# Patient Record
Sex: FEMALE | Race: WHITE | NOT HISPANIC OR LATINO | URBAN - METROPOLITAN AREA
[De-identification: names, ages, dates, MRNs, and addresses within clinical notes are randomized per-mention and may not be internally consistent; named-entity substitution may affect disease eponyms.]

---

## 2017-03-21 ENCOUNTER — OFFICE VISIT - RIVER FALLS (OUTPATIENT)
Dept: FAMILY MEDICINE | Facility: CLINIC | Age: 47
End: 2017-03-21

## 2018-01-24 ENCOUNTER — OFFICE VISIT - RIVER FALLS (OUTPATIENT)
Dept: FAMILY MEDICINE | Facility: CLINIC | Age: 48
End: 2018-01-24

## 2018-03-20 ENCOUNTER — OFFICE VISIT - RIVER FALLS (OUTPATIENT)
Dept: FAMILY MEDICINE | Facility: CLINIC | Age: 48
End: 2018-03-20

## 2018-03-20 ASSESSMENT — MIFFLIN-ST. JEOR: SCORE: 1341.66

## 2018-08-01 ENCOUNTER — AMBULATORY - RIVER FALLS (OUTPATIENT)
Dept: FAMILY MEDICINE | Facility: CLINIC | Age: 48
End: 2018-08-01

## 2018-08-01 ENCOUNTER — OFFICE VISIT - RIVER FALLS (OUTPATIENT)
Dept: FAMILY MEDICINE | Facility: CLINIC | Age: 48
End: 2018-08-01

## 2018-08-01 ASSESSMENT — MIFFLIN-ST. JEOR: SCORE: 1305.37

## 2019-10-29 ENCOUNTER — OFFICE VISIT - RIVER FALLS (OUTPATIENT)
Dept: FAMILY MEDICINE | Facility: CLINIC | Age: 49
End: 2019-10-29

## 2019-10-29 ASSESSMENT — MIFFLIN-ST. JEOR: SCORE: 1275.43

## 2019-10-30 ENCOUNTER — COMMUNICATION - RIVER FALLS (OUTPATIENT)
Dept: FAMILY MEDICINE | Facility: CLINIC | Age: 49
End: 2019-10-30

## 2019-10-30 LAB
BUN SERPL-MCNC: 20 MG/DL (ref 7–25)
BUN/CREAT RATIO - HISTORICAL: NORMAL (ref 6–22)
CALCIUM SERPL-MCNC: 9.7 MG/DL (ref 8.6–10.2)
CHLORIDE BLD-SCNC: 106 MMOL/L (ref 98–110)
CHOLEST SERPL-MCNC: 207 MG/DL
CHOLEST/HDLC SERPL: 3.4 {RATIO}
CO2 SERPL-SCNC: 29 MMOL/L (ref 20–32)
CREAT SERPL-MCNC: 0.83 MG/DL (ref 0.5–1.1)
EGFRCR SERPLBLD CKD-EPI 2021: 84 ML/MIN/1.73M2
ERYTHROCYTE [DISTWIDTH] IN BLOOD BY AUTOMATED COUNT: 11.8 % (ref 11–15)
GLUCOSE BLD-MCNC: 97 MG/DL (ref 65–99)
HCT VFR BLD AUTO: 39.6 % (ref 35–45)
HDLC SERPL-MCNC: 61 MG/DL
HGB BLD-MCNC: 13.5 GM/DL (ref 11.7–15.5)
LDLC SERPL CALC-MCNC: 122 MG/DL
MCH RBC QN AUTO: 30.3 PG (ref 27–33)
MCHC RBC AUTO-ENTMCNC: 34.1 GM/DL (ref 32–36)
MCV RBC AUTO: 88.8 FL (ref 80–100)
NONHDLC SERPL-MCNC: 146 MG/DL
PLATELET # BLD AUTO: 184 10*3/UL (ref 140–400)
PMV BLD: 11.9 FL (ref 7.5–12.5)
POTASSIUM BLD-SCNC: 4.5 MMOL/L (ref 3.5–5.3)
RBC # BLD AUTO: 4.46 10*6/UL (ref 3.8–5.1)
SODIUM SERPL-SCNC: 140 MMOL/L (ref 135–146)
TRIGL SERPL-MCNC: 127 MG/DL
TSH SERPL DL<=0.005 MIU/L-ACNC: 2.05 MIU/L
WBC # BLD AUTO: 4.5 10*3/UL (ref 3.8–10.8)

## 2019-10-31 ENCOUNTER — TRANSFERRED RECORDS (OUTPATIENT)
Dept: HEALTH INFORMATION MANAGEMENT | Facility: CLINIC | Age: 49
End: 2019-10-31

## 2019-10-31 LAB — HPV ABSTRACT: NORMAL

## 2019-11-05 ENCOUNTER — COMMUNICATION - RIVER FALLS (OUTPATIENT)
Dept: FAMILY MEDICINE | Facility: CLINIC | Age: 49
End: 2019-11-05

## 2020-04-28 ENCOUNTER — OFFICE VISIT - RIVER FALLS (OUTPATIENT)
Dept: FAMILY MEDICINE | Facility: CLINIC | Age: 50
End: 2020-04-28

## 2020-05-20 ENCOUNTER — OFFICE VISIT - RIVER FALLS (OUTPATIENT)
Dept: FAMILY MEDICINE | Facility: CLINIC | Age: 50
End: 2020-05-20

## 2020-05-26 ENCOUNTER — OFFICE VISIT - RIVER FALLS (OUTPATIENT)
Dept: FAMILY MEDICINE | Facility: CLINIC | Age: 50
End: 2020-05-26

## 2022-02-11 VITALS
TEMPERATURE: 98 F | SYSTOLIC BLOOD PRESSURE: 108 MMHG | HEIGHT: 63 IN | HEART RATE: 68 BPM | DIASTOLIC BLOOD PRESSURE: 60 MMHG | BODY MASS INDEX: 27.78 KG/M2 | WEIGHT: 156.8 LBS

## 2022-02-11 VITALS
BODY MASS INDEX: 29.2 KG/M2 | OXYGEN SATURATION: 97 % | TEMPERATURE: 98.5 F | HEIGHT: 63 IN | DIASTOLIC BLOOD PRESSURE: 62 MMHG | SYSTOLIC BLOOD PRESSURE: 104 MMHG | WEIGHT: 164.8 LBS | HEART RATE: 98 BPM

## 2022-02-11 VITALS
BODY MASS INDEX: 26.61 KG/M2 | TEMPERATURE: 98.1 F | HEART RATE: 91 BPM | DIASTOLIC BLOOD PRESSURE: 70 MMHG | WEIGHT: 150.2 LBS | SYSTOLIC BLOOD PRESSURE: 126 MMHG | HEIGHT: 63 IN | OXYGEN SATURATION: 97 %

## 2022-02-11 VITALS
TEMPERATURE: 99 F | OXYGEN SATURATION: 96 % | WEIGHT: 159.8 LBS | SYSTOLIC BLOOD PRESSURE: 100 MMHG | HEART RATE: 109 BPM | BODY MASS INDEX: 28.31 KG/M2 | DIASTOLIC BLOOD PRESSURE: 64 MMHG

## 2022-02-11 VITALS
TEMPERATURE: 101.6 F | DIASTOLIC BLOOD PRESSURE: 58 MMHG | BODY MASS INDEX: 28.34 KG/M2 | HEART RATE: 110 BPM | WEIGHT: 160 LBS | SYSTOLIC BLOOD PRESSURE: 100 MMHG | OXYGEN SATURATION: 96 %

## 2022-02-11 VITALS — HEIGHT: 63 IN | BODY MASS INDEX: 27.17 KG/M2 | BODY MASS INDEX: 27.17 KG/M2 | HEIGHT: 63 IN

## 2022-02-11 VITALS — HEIGHT: 63 IN

## 2022-02-16 NOTE — PROGRESS NOTES
Chief Complaint    Patient presents for annual physical. Pressure bilateral sternum moves inner left and right upper arms, aslo, inner left thigh sharp pain off/on x 6 month. Fith digit on left foot gray spot and right great toe with gray spots no known injury x 6 weeks.  History of Present Illness          Patient is here for her annual physical.  It has been awhile since she had a physical and is due for a pap smear.   She does have some concerns about having some sternum pressure that radiates to her right upper arm for the past 6 months.  Does feel fatigued as well with her symptoms.  She also has pain to her left thigh for same length of time.  She also has concerns about some bruising/discoloration to her right great toenail.  She denies any injury to her toe and has noticed it for about 3-4 weeks.  Her menstrual cycle has always been infrequent and the flow changes from being slight to heavy.  She does have an IUD that needs to be removed and would like to discuss going back on BCP.            Weight one year ago 156.8lbs.  Today s weight 150.2lbs.          Last Pap:  12/27/2013          Hx of abnormal paps:  No            Most recent mammogram:  Due          Colon cancer screening status:  N/A          Dexa scan:  N/A          Last Dental Exam:  UTD          Last Eye Exam: UTD          Immunizations:  Flu shot due.              Review of Systems      Constitutional: Negative.      Eye: Negative.      Ear/Nose/Mouth/Throat: Negative.      Respiratory: Negative.      Cardiovascular: Negative.      Gastrointestinal: Negative.      Genitourinary: Negative.      Hematology/Lymphatics: Negative.      Endocrine: Negative.      Immunologic: Negative.      Musculoskeletal: Negative.      Integumentary: Negative.      Neurologic: Negative.      Psychiatric: Negative.      All other systems reviewed and negative         Physical Exam   Vitals & Measurements    T: 98.1   F (Tympanic)  HR: 91(Peripheral)  BP: 126/70   SpO2: 97%     HT: 63 in  WT: 150.2 lb  BMI: 26.6       General: Alert and oriented, Fatigue, No acute distress.      Eye: Pupils are equal, round and reactive to light, Extraocular movements are intact, Normal conjunctiva.      HENT: Normocephalic, Tympanic membranes are clear, Oral mucosa is moist, No pharyngeal erythema, No sinus tenderness.      Neck: Supple, Non-tender, No carotid bruit, No lymphadenopathy, No thyromegaly.      Respiratory: Lungs are clear to auscultation, Respirations are non-labored, Breath sounds are equal, No chest wall tenderness.      Cardiovascular: Normal rate, Regular rhythm, No murmur, No gallop, Normal peripheral perfusion, No edema.      Breast: No mass, No tenderness, No discharge.      Gastrointestinal: Soft, Non-tender, Normal bowel sounds, No organomegaly.      Genitourinary: No costovertebral angle tenderness.      Labia: Within normal limits.      Urethra: Within normal limits.      Vagina: Within normal limits.      Ovaries: Within normal limits.      Adnexa: no masses or tenderness.      Musculoskeletal: Normal range of motion, Normal strength, No swelling, No deformity.      Integumentary: Warm, Dry, No rash.  subungual hemorrhage right great toenail.  Mole to outer RLE.      Neurologic: Alert, Oriented, Normal sensory, Normal motor function, No focal deficits, Normal deep tendon reflexes.      Psychiatric: Cooperative, Appropriate mood & affect  Assessment/Plan       1. Encounter for annual physical exam (Z00.00)         Discussed diet, exercise/activity level, medications, preventative services, immunizations.  IUD removed, will resume BCP, prescription has been sent in.  Also refilled Valtrex.  Will set up mammogram.  RTC 1 year for annual physical.                2. Fatigue (R53.83)         CBC/plt, BMP, TSH today.                3. Discoloration of nail (L60.8)         Continue to observe nail discoloration for right great toe over the next 1-2 months.  Follow up if not  improving.                4. Screening for cervical cancer (Z12.4)         Thin prep w/ HPV testing today.                5. Screening for lipid disorders (Z13.220)         Lipid panel today.                6. Encounter for immunization (Z23)         Flu shot today.         Ordered:          influenza virus vaccine, inactivated, 0.5 mL, IM, once, (Completed)          46680 imadm prq id subq/im njxs 1 vaccine (Charge), Quantity: 1, Encounter for immunization          06099 influenza vac 4 valent prsrv free 3 yrs plus im (Charge), Quantity: 1, Encounter for immunization                Orders:         ethinyl estradiol-levonorgestrel, 1 tab(s), Oral, daily, # 84 tab(s), 3 Refill(s), Type: Maintenance, Pharmacy: [facility] #[id number], 1 tab(s) Oral daily, (Ordered)         valACYclovir, = 2 tab(s) ( 2 gm ), Oral, bid, # 20 tab(s), 1 Refill(s), Type: Maintenance, Pharmacy: [facility] #[id number], 2 tab(s) Oral bid,x1 day(s), (Ordered)         Basic Metabolic Panel* ([facility]), Specimen Type: Serum, Collection Date: 10/29/19 11:04:00 CDT         CBC (h/h, RBC, indices, WBC, Plt)* ([facility]), Specimen Type: Blood, Collection Date: 10/29/19 11:04:00 CDT         Lipid panel with reflex to direct ldl* ([facility]), Specimen Type: Serum, Collection Date: 10/29/19 11:04:00 CDT         Return to Clinic (Request), RFV: Yearly exam/physical, Return in 1 year         ThinPrep Imaging Pap and HPV mRNA E6/E7* ([facility]), Specimen Type: Pap, Collection Date: 10/29/19 11:04:00 CDT         TSH* ([facility]), Specimen Type: Serum, Collection Date: 10/29/19 11:04:00 CDT      Tiara KHAN MA, acted solely as a scribe for, and in the presence of Dr. Willie Priest who performed the services.             Willie KHAN MD, personally performed the services described in this documentation.  The documentation was scribed in my presence and is both accurate and complete.                Patient Information     Name:LAURYN HANNA       Address:      [address] [id number]     Sex:Female     YOB: 1970     Emergency Contact:RADHA HANNA     MRN:[id number]     FIN:[id number]     Location:[facility]     Date of Service:10/29/2019      Primary Care Physician:       Gadiel Villarreal MD, [phone]      Attending Physician:       Willie Priest MD, [phone]  Problem List/Past Medical History    Ongoing     No qualifying data    Historical     Pregnancy     Pregnancy     Pregnancy     Pregnancy  Procedure/Surgical History     Insertion of IUD (06/20/2007)           Pap smear (04/04/2007)            Comments: WNL.     History of - 1 miscarriage        Medications    Advil Cold & Sinus, 1 tab(s), Oral, q4 hrs, PRN    ethinyl estradiol-levonorgestrel 20 mcg-100 mcg oral tablet, 1 tab(s), Oral, daily, 3 refills    multivitamin with minerals (w/ Iron), daily    Pepcid AC, 10 mg, Oral, once    Valtrex 1 g oral tablet, 2 gm= 2 tab(s), Oral, bid, 1 refills    Zantac  Allergies    No Known Medication Allergies  Social History    Smoking Status - 10/29/2019     Never smoker     Alcohol      1 x per week, 1 drinks/episode average. 2 drinks/episode maximum., 10/26/2016     Employment/School      Employed, Work/School description: ., 12/14/2010     Exercise      Exercise frequency: 1-2 times/week. Exercise type: Walking., 10/29/2019     Home/Environment      Marital status: . Spouse/Partner name: Radha., 12/27/2013     Nutrition/Health      Type of diet: Regular, No bread., 10/29/2019     Other      Martial status, , 12/14/2010     Sexual      Sexually active: Yes. Sexual orientation: Straight or heterosexual. Contraceptive Use Details: Intrauterine device., 10/29/2019     Substance Abuse      Never, 10/29/2019     Tobacco      Never (less than 100 in lifetime), 10/29/2019  Family History    Diabetes mellitus: Father and Brother.    Hyperlipidemia: Mother and  Brother.    Hypothyroid: Mother and Grandmother (M).  Immunizations      Vaccine Date Status      influenza virus vaccine, inactivated 10/29/2019 Given      tetanus/diphth/pertuss (Tdap) adult/adol 12/27/2013 Given      influenza virus vaccine, inactivated 10/12/2011 Given      Td 11/08/2005 Recorded      Td 04/28/2005 Recorded

## 2022-02-16 NOTE — PROGRESS NOTES
Patient:   LAURYN HANNA            MRN: [id number]            FIN: [id number]               Age:   49 years     Sex:  Female     :  1970   Associated Diagnoses:   Persistent cough   Author:   Moshe Alonso PA-C      Report Summary   Diagnosis  Persistent cough ([id number] R05).  Patient InstructionsOrders   Visit Information      Date of Service: 2020 11:36 am  Performing Location: [facility]  Encounter#: [id number]      Primary Care Provider (PCP):  Gadiel Villarreal MD    NPI# [id number]      Referring Provider:  Moshe Alonso PA-C    NPI# [id number]   Visit type:  Telephone Encounter.    Source of history:  Patient.    Location of patient:  Home  Call Start Time:   2:38  Call End Time:    2:42      Chief Complaint   Cough persists   _      History of Present Illness   Today's visit was conducted via telephone due to the COVID-19 pandemic. Patient's consent to telephone visit was obtained and documented.      Reason for visit: In ER last month. Prednisone helped.   Finished that on . Cough not worse at night. Tight in chest. No Covid exposure.   Albuterol helps  No recent fevers      Review of Systems   Constitutional:  Fever, Chills, 2 weeks ago.    Eye:  Negative.    Respiratory:  Cough, No sputum production.    Cardiovascular:  Negative.       Health Status   Allergies:    Allergic Reactions (Selected)  No Known Medication Allergies   Medications:  (Selected)   Prescriptions  Prescribed  Valtrex 1 g oral tablet: = 2 tab(s) ( 2 gm ), Oral, bid, # 20 tab(s), 1 Refill(s), Type: Maintenance, Pharmacy: [facility] #[id number], 2 tab(s) Oral bid,x1 day(s)  albuterol 90 mcg/inh inhalation aerosol: 2 puff(s), INH, QID, PRN: for wheezing, # 17 g, 0 Refill(s), Type: Maintenance, Pharmacy: [facility] , 2 puff(s) Inhale qid,PRN:for wheezing  predniSONE 10 mg oral tablet: 4 tabs daily x 4, then 3 tabs daily x 4, then 2 tabs daily x 4, then 1 tab daily x 4.,  Oral, daily, # 40 tab(s), 0 Refill(s), Type: Acute, Pharmacy: [facility] , 4 tabs daily x 4, then 3 tabs daily x 4, then 2 tabs daily x 4, t...  Documented Medications  Documented  Advil Cold & Sinus: 1 tab(s), po, q4 hrs, PRN: as needed for cold symptoms, 0 Refill(s), Type: Maintenance  Pepcid AC: ( 10 mg ), Oral, once, 0 Refill(s), Type: Maintenance  Tylenol: Oral, 0 Refill(s), Type: Maintenance  multivitamin with minerals (w/ Iron): daily, 0 Refill(s), Type: Maintenance      Histories   Social History:        Alcohol Assessment            1 x per week, 1 drinks/episode average.  2 drinks/episode maximum.      Tobacco Assessment            Never (less than 100 in lifetime)      Substance Abuse Assessment            Never      Employment and Education Assessment            Employed, Work/School description: .      Home and Environment Assessment            Marital status: .  Spouse/Partner name: Gil.      Nutrition and Health Assessment            Type of diet: Regular, No bread.      Exercise and Physical Activity Assessment            Exercise frequency: 1-2 times/week.  Exercise type: Walking.                     Comments:                      12/27/2010 - Katharine Han RN                     2-3 times weekly      Sexual Assessment            Sexually active: Yes.  Sexual orientation: Straight or heterosexual.  Contraceptive Use Details: Intrauterine               device.      Other Assessment            Martial status,         Physical Examination   VS/Measurements      Impression and Plan   Diagnosis     Persistent cough (UJH51-MF R05).     Patient Instructions:       Counseled: Patient, Regarding diagnosis, Regarding treatment, Regarding medications, Activity, Verbalized understanding.    Orders     Orders (Selected)   Prescriptions  Prescribed  predniSONE 10 mg oral tablet: 4 tabs daily x 4, then 3 tabs daily x 4, then 2 tabs daily x 4, then  1 tab daily x 4., Oral, daily, # 40 tab(s), 0 Refill(s), Type: Acute, Pharmacy: [facility] , 4 tabs daily x 4, then 3 tabs daily x 4, then 2 tabs daily x 4, t....     FU as we discussed for acute worsening of symptoms.

## 2022-02-16 NOTE — PROGRESS NOTES
Patient:   LAURYN HANNA            MRN: [id number]            FIN: [id number]               Age:   47 years     Sex:  Female     :  1970   Associated Diagnoses:   Influenza A   Author:   Gavin YOO, Moshe      Report Summary   Diagnosis  Influenza A (KDD11-MX J10.1).  Patient InstructionsOrders   Visit Information      Date of Service: 2018 01:37 pm  Performing Location: [facility]  Encounter#: [id number]      Primary Care Provider (PCP):  Gadiel Villarreal MD    NPI# [id number]   Visit type:  New symptom.    Accompanied by:  No one.    Source of history:  Self, Medical record.    Referral source:  Self.    History limitation:  None.       Chief Complaint   2018 1:42 PM CST    HA, cough, chest congestion, aches and sweats since         History of Present Illness             The patient presents with cough.  The cough is described as hacking.  The severity of the cough is moderate.  The cough is constant.  The cough has lasted for 4 day(s).  The context of the cough: occurred in association with illness.  Associated symptoms consist of chills, fever, rhinorrhea, HA. Myalgias and denies shortness of breath.        Review of Systems   Constitutional:  Fever.    Ear/Nose/Mouth/Throat:  Nasal congestion.    Respiratory:  Cough.             Health Status   Allergies:    Allergic Reactions (All)  No known allergies   Medications:  (Selected)   Prescriptions  Prescribed  Tamiflu 75 mg oral capsule: 1 cap(s) ( 75 mg ), PO, BID, # 10 cap(s), 0 Refill(s), Type: Maintenance, Pharmacy: [facility] #[id number], 1 cap(s) po bid,x5 day(s)  Documented Medications  Documented  Advil Cold & Sinus: 1 tab(s), po, q4 hrs, PRN: as needed for cold symptoms, 0 Refill(s), Type: Maintenance  multivitamin with minerals (w/ Iron): daily, 0 Refill(s), Type: Maintenance   Problem list:    All Problems  Morbid obesity / SNOMED CT 436603210 / Probable  Resolved: Pregnancy / SNOMED CT 313657981  Resolved:  Pregnancy / SNOMED CT 968544630  Resolved: Pregnancy / SNOMED CT 041055758  Resolved: Pregnancy / SNOMED CT 523413399      Histories   Past Medical History:    Resolved  Pregnancy (403384025):  Resolved on 4/3/1997 at 26 years.  Pregnancy (388405004):  Resolved on 2/7/1998 at 27 years.  Pregnancy (716480320):  Resolved on 9/12/2002 at 31 years.  Pregnancy (050503782):  Resolved on 9/9/1999 at 28 years.   Family History:    Hypothyroid  Mother  Grandmother (M)  Diabetes mellitus  Father  Comments:  12/27/2010 2:08 PM - Guille TAO, Katharine  Insulin Dependent  Brother  Comments:  12/27/2010 2:08 PM - Guille TAO, Katharine  Insulin Dependent  Hyperlipidemia  Mother  Brother     Procedure history:    Insertion of IUD (944218437) on 6/20/2007 at 36 Years.  History of - 1 miscarriage (6974442760).   Social History:        Alcohol Assessment: Denies Alcohol Use            1 x per week, 1 drinks/episode average.  2 drinks/episode maximum.      Tobacco Assessment: Denies Tobacco Use      Substance Abuse Assessment: Denies Substance Abuse      Employment and Education Assessment            Employed, Work/School description: .      Home and Environment Assessment            Marital status: .  Spouse/Partner name: iGl.      Nutrition and Health Assessment            Type of diet: Regular.      Exercise and Physical Activity Assessment: Occasional exercise            Exercise type: Aerobics, Walking.      Sexual Assessment            Sexually active: No.  Contraceptive Use Details: Intrauterine device.      Other Assessment            Martial status,         Physical Examination   Vital Signs   1/24/2018 1:42 PM CST Temperature Tympanic 99.0 DegF    Peripheral Pulse Rate 109 bpm  HI    HR Method Electronic    Systolic Blood Pressure 100 mmHg    Diastolic Blood Pressure 64 mmHg    Mean Arterial Pressure 76 mmHg    BP Site Left arm    BP Method Manual    Oxygen Saturation 96 %      Measurements  from flowsheet : Measurements   1/24/2018 1:42 PM CST    Weight Measured - Standard                159.8 lb     General:  Alert and oriented, No acute distress.    Eye:  Pupils are equal, round and reactive to light, Extraocular movements are intact, Normal conjunctiva.    HENT:  Normocephalic, Tympanic membranes are clear, Oral mucosa is moist.         Nose: Both nostrils, Discharge ( Moderate amount, Clear ).    Neck:  Supple, No lymphadenopathy.    Respiratory:  Lungs are clear to auscultation, Respirations are non-labored, Breath sounds are equal.    Cardiovascular:  Normal rate, Regular rhythm, No murmur.       Review / Management   Results review:  Lab results   1/24/2018 2:38 PM CST Influenza A POC Positive    Influenza B POC Negative   .       Impression and Plan   Diagnosis     Influenza A (LPR05-BF J10.1).     Patient Instructions:       Counseled: Patient, Regarding diagnosis, Regarding treatment, Regarding medications, Regarding activity, Verbalized understanding.    Orders     Orders (Selected)   Prescriptions  Prescribed  Tamiflu 75 mg oral capsule: 1 cap(s) ( 75 mg ), PO, BID, # 10 cap(s), 0 Refill(s), Type: Maintenance, Pharmacy: [facility] #[id number], 1 cap(s) po bid,x5 day(s).     Take medicine as prescribed, side effects discussed.  Tylenol/ibuprofen for fever and discomfort.  Push fluids.  RTC if not improving in 36-48 hours, prior if concerns as we have discussed.

## 2022-02-16 NOTE — NURSING NOTE
Patient called @ 5512 asking about her IUD/Mirena and when that was put in. I confirmed from her history/procedure tab it was inserted 2007. Her 10 years is up and the IUD is . I informed her that she is overdue for a physical w/ GTG. She agreed and will call back for an annual w/ IUD extraction.

## 2022-02-16 NOTE — TELEPHONE ENCOUNTER
---------------------  From: Willie Priest MD   To: LAURYN HANNA    Sent: 11/5/2019 8:36:38 AM CST  Subject: Patient Message - Results Notification          Your Pap smear was normal.  Repeat in five years.

## 2022-02-16 NOTE — PROGRESS NOTES
Patient:   LAURYN HANNA            MRN: [id number]            FIN: [id number]               Age:   49 years     Sex:  Female     :  1970   Associated Diagnoses:   Pneumonitis   Author:   Gadiel Villarreal MD      Visit Information      Date of Service: 2020 07:54 am  Performing Location: [facility]  Encounter#: [id number]      Primary Care Provider (PCP):  Gadiel Villarreal MD    NPI# [id number]      Referring Provider:  Gadiel Villarreal MD    NPI# [id number]   Visit type:  Telephone Encounter.    Source of history:  Patient.    Location of patient:  Home  Call Start Time:   839  Call End Time:    848      Chief Complaint   2020 8:19 AM CDT    Follow up ER visit not getting any better, cough; verbal permission for telephone visit     _      History of Present Illness   Today's visit was conducted via telephone due to the COVID-19 pandemic. Patient's consent to telephone visit was obtained and documented.      Reason for visit: In ER , cough x 1 month.        Review of Systems   Constitutional:  Fever, Chills, 2 weeks ago.    Eye:  Negative.    Respiratory:  Cough, No sputum production.    Cardiovascular:  Negative.       Impression and Plan   Diagnosis     Pneumonitis (GZY82-AI J18.9).     Course:  Progressing as expected.    Orders     Orders   Pharmacy:  azithromycin 250 mg oral tablet (Prescribe): = 1 packet(s), PO, Once, Instructions: as directed on package labeling, # 6 tab(s), 0 Refill(s), Type: Soft Stop, Pharmacy: [facility] , 1 packet(s) Oral once,Instr:as directed on package labeling  albuterol 90 mcg/inh inhalation aerosol (Prescribe): 2 puff(s), INH, QID, PRN: for wheezing, # 17 g, 0 Refill(s), Type: Maintenance, Pharmacy: [facility] , 2 puff(s) Inhale qid,PRN:for wheezing.     Orders     F/U in 48-72 hours if not improving, sooner if getting worse.     Plan:  Declined Covid testing as febrile part of  illness is over and original illness started 6 weeks ago.  Will come in for antibody testing when well..       Health Status   Allergies:    Allergic Reactions (Selected)  No Known Medication Allergies   Medications:  (Selected)   Prescriptions  Prescribed  Valtrex 1 g oral tablet: = 2 tab(s) ( 2 gm ), Oral, bid, # 20 tab(s), 1 Refill(s), Type: Maintenance, Pharmacy: [facility] #[id number], 2 tab(s) Oral bid,x1 day(s)  Documented Medications  Documented  Advil Cold & Sinus: 1 tab(s), po, q4 hrs, PRN: as needed for cold symptoms, 0 Refill(s), Type: Maintenance  Pepcid AC: ( 10 mg ), Oral, once, 0 Refill(s), Type: Maintenance  Tylenol: Oral, 0 Refill(s), Type: Maintenance  Zantac: 0 Refill(s), Type: Maintenance  multivitamin with minerals (w/ Iron): daily, 0 Refill(s), Type: Maintenance   Problem list:    No problem items selected or recorded.      Histories   Past Medical History:    Resolved  Pregnancy (SNOMED CT 143065638):  Resolved on 4/3/1997 at 26 years.  Pregnancy (SNOMED CT 228684461):  Resolved on 2/7/1998 at 27 years.  Pregnancy (SNOMED CT 950178883):  Resolved on 9/12/2002 at 31 years.  Pregnancy (SNOMED CT 589881798):  Resolved on 9/9/1999 at 28 years.   Family History:    Hypothyroid  Mother  Grandmother (M)  Diabetes mellitus  Father  Comments:  12/27/2010 2:08 PM Katharine Kovacs RN  Insulin Dependent  Brother  Comments:  12/27/2010 2:08 PM Katharine Kovacs RN  Insulin Dependent  Hyperlipidemia  Mother  Brother     Procedure history:    Insertion of IUD (SNOMED CT 816319547) on 6/20/2007 at 36 Years.  History of - 1 miscarriage (SNOMED CT 0122698415).   Social History:        Alcohol Assessment            1 x per week, 1 drinks/episode average.  2 drinks/episode maximum.      Tobacco Assessment            Never (less than 100 in lifetime)      Substance Abuse Assessment            Never      Employment and Education Assessment            Employed, Work/School description:  .      Home and Environment Assessment            Marital status: .  Spouse/Partner name: Gil.      Nutrition and Health Assessment            Type of diet: Regular, No bread.      Exercise and Physical Activity Assessment            Exercise frequency: 1-2 times/week.  Exercise type: Walking.                     Comments:                      12/27/2010 - Guille TAO, Katharine                     2-3 times weekly      Sexual Assessment            Sexually active: Yes.  Sexual orientation: Straight or heterosexual.  Contraceptive Use Details: Intrauterine               device.      Other Assessment            Martial status,         Physical Examination   Measurements from flowsheet : Measurements   4/28/2020 8:19 AM CDT    Height Measured - Standard                63 in

## 2022-02-16 NOTE — NURSING NOTE
Comprehensive Intake Entered On:  5/26/2020 2:35 PM CDT    Performed On:  5/26/2020 2:33 PM CDT by Vickie Najera CMA               Summary   Chief Complaint :   Phone visit: follow-up SOB, over the weekend, heavy in chest, low energy, verbal consent for phone visit   Height Measured :   63 in(Converted to: 5 ft 3 in, 160.02 cm)    Vickie Najera CMA - 5/26/2020 2:33 PM CDT   Health Status   Allergies Verified? :   Yes   Medication History Verified? :   Yes   Immunizations Current :   Yes   Medical History Verified? :   Yes   Pre-Visit Planning Status :   Completed   Tobacco Use? :   Never smoker   Vickie Najera CMA - 5/26/2020 2:33 PM CDT   Consents   Consent for Immunization Exchange :   Consent Granted   Consent for Immunizations to Providers :   Consent Granted   Vickie Najera CMA - 5/26/2020 2:33 PM CDT   Meds / Allergies   (As Of: 5/26/2020 2:35:43 PM CDT)   Allergies (Active)   No Known Medication Allergies  Estimated Onset Date:   Unspecified ; Created By:   Ann Pretty CMA; Reaction Status:   Active ; Category:   Drug ; Substance:   No Known Medication Allergies ; Type:   Allergy ; Updated By:   Ann Pretty CMA; Reviewed Date:   5/26/2020 2:35 PM CDT        Medication List   (As Of: 5/26/2020 2:35:43 PM CDT)   Prescription/Discharge Order    albuterol  :   albuterol ; Status:   Prescribed ; Ordered As Mnemonic:   albuterol 90 mcg/inh inhalation aerosol ; Simple Display Line:   2 puff(s), INH, QID, PRN: for wheezing, 17 g, 0 Refill(s) ; Ordering Provider:   Gary Villarreal MD; Catalog Code:   albuterol ; Order Dt/Tm:   4/28/2020 8:45:54 AM CDT          valACYclovir  :   valACYclovir ; Status:   Prescribed ; Ordered As Mnemonic:   Valtrex 1 g oral tablet ; Simple Display Line:   2 gm, 2 tab(s), Oral, bid, for 1 day(s), 20 tab(s), 1 Refill(s) ; Ordering Provider:   Willie Priest MD; Catalog Code:   valACYclovir ; Order Dt/Tm:   10/29/2019 11:08:30 AM CDT            Home Meds    acetaminophen  :    acetaminophen ; Status:   Documented ; Ordered As Mnemonic:   Tylenol ; Simple Display Line:   Oral, 0 Refill(s) ; Catalog Code:   acetaminophen ; Order Dt/Tm:   4/28/2020 8:23:23 AM CDT          famotidine  :   famotidine ; Status:   Documented ; Ordered As Mnemonic:   Pepcid AC ; Simple Display Line:   10 mg, Oral, once, 0 Refill(s) ; Catalog Code:   famotidine ; Order Dt/Tm:   8/1/2018 3:16:57 PM CDT          ibuprofen-pseudoephedrine  :   ibuprofen-pseudoephedrine ; Status:   Documented ; Ordered As Mnemonic:   Advil Cold & Sinus ; Simple Display Line:   1 tab(s), po, q4 hrs, PRN: as needed for cold symptoms, 0 Refill(s) ; Catalog Code:   ibuprofen-pseudoephedrine ; Order Dt/Tm:   11/18/2015 2:09:38 PM CST          multivitamin with minerals  :   multivitamin with minerals ; Status:   Documented ; Ordered As Mnemonic:   multivitamin with minerals (w/ Iron) ; Simple Display Line:   daily, 0 Refill(s) ; Catalog Code:   multivitamin with minerals ; Order Dt/Tm:   11/18/2015 2:09:26 PM CST            ID Risk Screen   Recent Travel History :   No recent travel   Family Member Travel History :   No recent travel   Other Exposure to Infectious Disease :   Unknown   Vickie Najera CMA - 5/26/2020 2:33 PM CDT

## 2022-02-16 NOTE — NURSING NOTE
Comprehensive Intake Entered On:  4/28/2020 8:23 AM CDT    Performed On:  4/28/2020 8:19 AM CDT by [place] Lindo MA               Summary   Chief Complaint :   Follow up ER visit not getting any better, cough; verbal permission for telephone visit   Height Measured :   63 in(Converted to: 5 ft 3 in, 160.02 cm)    Belg[place] MCCABE [place] - 4/28/2020 8:19 AM CDT   Health Status   Allergies Verified? :   Yes   Medication History Verified? :   Yes   Immunizations Current :   Yes   Medical History Verified? :   Yes   Pre-Visit Planning Status :   Completed   Tobacco Use? :   Never smoker   Belgica MCCABEFlorida - 4/28/2020 8:19 AM CDT   Consents   Consent for Immunization Exchange :   Consent Granted   Consent for Immunizations to Providers :   Consent Granted   [place] Lindo MA - 4/28/2020 8:19 AM CDT   Meds / Allergies   (As Of: 4/28/2020 8:23:38 AM CDT)   Allergies (Active)   No Known Medication Allergies  Estimated Onset Date:   Unspecified ; Created By:   Ann Pretty CMA; Reaction Status:   Active ; Category:   Drug ; Substance:   No Known Medication Allergies ; Type:   Allergy ; Updated By:   Ann Pretty CMA; Reviewed Date:   4/28/2020 8:23 AM CDT        Medication List   (As Of: 4/28/2020 8:23:38 AM CDT)   Prescription/Discharge Order    valACYclovir  :   valACYclovir ; Status:   Prescribed ; Ordered As Mnemonic:   Valtrex 1 g oral tablet ; Simple Display Line:   2 gm, 2 tab(s), Oral, bid, for 1 day(s), 20 tab(s), 1 Refill(s) ; Ordering Provider:   Willie Priest MD; Catalog Code:   valACYclovir ; Order Dt/Tm:   10/29/2019 11:08:30 AM CDT            Home Meds    acetaminophen  :   acetaminophen ; Status:   Documented ; Ordered As Mnemonic:   Tylenol ; Simple Display Line:   Oral, 0 Refill(s) ; Catalog Code:   acetaminophen ; Order Dt/Tm:   4/28/2020 8:23:23 AM CDT          famotidine  :   famotidine ; Status:   Documented ; Ordered As Mnemonic:   Pepcid AC ; Simple Display Line:   10 mg, Oral, once, 0 Refill(s)  ; Catalog Code:   famotidine ; Order Dt/Tm:   8/1/2018 3:16:57 PM CDT          raNITIdine  :   raNITIdine ; Status:   Documented ; Ordered As Mnemonic:   Zantac ; Simple Display Line:   0 Refill(s) ; Catalog Code:   raNITIdine ; Order Dt/Tm:   8/1/2018 3:17:03 PM CDT          ibuprofen-pseudoephedrine  :   ibuprofen-pseudoephedrine ; Status:   Documented ; Ordered As Mnemonic:   Advil Cold & Sinus ; Simple Display Line:   1 tab(s), po, q4 hrs, PRN: as needed for cold symptoms, 0 Refill(s) ; Catalog Code:   ibuprofen-pseudoephedrine ; Order Dt/Tm:   11/18/2015 2:09:38 PM CST          multivitamin with minerals  :   multivitamin with minerals ; Status:   Documented ; Ordered As Mnemonic:   multivitamin with minerals (w/ Iron) ; Simple Display Line:   daily, 0 Refill(s) ; Catalog Code:   multivitamin with minerals ; Order Dt/Tm:   11/18/2015 2:09:26 PM CST

## 2022-02-16 NOTE — TELEPHONE ENCOUNTER
Entered by Juan Jose Christopher MD on October 04, 2021 3:50:28 PM CDT  ---------------------  From: Juan Jose Christopher MD   To: [facility] [place]    Sent: 10/4/2021 3:50:28 PM CDT  Subject: Medication Management     ** Submitted: **  Complete:valACYclovir (Valtrex 1 g oral tablet)   Signed by Juan Jose Christopher MD  10/4/2021 8:50:00 PM RUST    ** Approved with modifications: **  valACYclovir (valacyclovir 1 gram tablet)  TAKE TWO TABLETS BY MOUTH AT ONSET of COLD SORE THEN REPEAT in 12 hours  Qty:  20 tab(s)        Days Supply:  5        Refills:  0          Substitutions Allowed     Route To Pharmacy - [facility]   Note to Pharmacy:  due for visit  Signed by Juan Jose Christopher MD            ** Patient matched by Juan Jose Christopher MD on 10/4/2021 3:49:50 PM CDT **      ------------------------------------------  [facility]  [facility]  Sent: October 1, 2021 9:15:29 AM CDT  Subject: Medication Management  Due: October 1, 2021 3:34:37 PM CDT     ** On Hold Pending Signature **     Drug: valACYclovir (valACYclovir 1 g oral tablet), TAKE TWO TABLETS BY MOUTH AT ONSET of COLD SORE THEN REPEAT in 12 hours  Quantity: 20 tab(s)  Days Supply: 5  Refills: 4  Substitutions Allowed  Notes from Pharmacy:     Dispensed Drug: valACYclovir (valACYclovir 1 g oral tablet), TAKE TWO TABLETS BY MOUTH AT ONSET of COLD SORE THEN REPEAT in 12 hours  Quantity: 20 tab(s)  Days Supply: 5  Refills: 5  Substitutions Allowed  Notes from Pharmacy:  ------------------------------------------Has not been seen for more than one year. Needs visit for further refills.

## 2022-02-16 NOTE — PROGRESS NOTES
Patient:   LAURYN HANNA            MRN: [id number]            FIN: [id number]               Age:   49 years     Sex:  Female     :  1970   Associated Diagnoses:   Persistent cough for 3 weeks or longer   Author:   Moshe Alonso PA-C      Report Summary   Diagnosis  Persistent cough for 3 weeks or longer ([id number]).  Patient InstructionsOrders   Visit Information      Date of Service: 2020 10:19 am  Performing Location: [facility]  Encounter#: [id number]      Primary Care Provider (PCP):  Gadiel Villarreal MD    NPI# [id number]      Referring Provider:  Moshe Alonso PA-C    NPI# [id number]   Visit type:  Telephone Encounter.    Source of history:  Patient.    Location of patient:  Home  Call Start Time:   1035  Call End Time:    1040      Chief Complaint   Cough persists   _      History of Present Illness   Today's visit was conducted via telephone due to the COVID-19 pandemic. Patient's consent to telephone visit was obtained and documented.      Reason for visit: In ER last month. Phone visit with CHT. Given Z beth. 60% improved. Feels run down.  Cough not worse at night.  Albuterol helps      Review of Systems   Constitutional:  Fever, Chills, 2 weeks ago.    Eye:  Negative.    Respiratory:  Cough, No sputum production.    Cardiovascular:  Negative.       Health Status   Allergies:    Allergic Reactions (Selected)  No Known Medication Allergies   Medications:  (Selected)   Prescriptions  Prescribed  Valtrex 1 g oral tablet: = 2 tab(s) ( 2 gm ), Oral, bid, # 20 tab(s), 1 Refill(s), Type: Maintenance, Pharmacy: [facility] #[id number], 2 tab(s) Oral bid,x1 day(s)  albuterol 90 mcg/inh inhalation aerosol: 2 puff(s), INH, QID, PRN: for wheezing, # 17 g, 0 Refill(s), Type: Maintenance, [facility] , 2 puff(s) Inhale qid,PRN:for wheezing  predniSONE 20 mg oral tablet: = 2 tab(s) ( 40 mg ), Oral, daily, x 5 day(s), # 10 tab(s), 0 Refill(s), Type:  Acute, Pharmacy: [facility] , 2 tab(s) Oral daily,x5 day(s)  Documented Medications  Documented  Advil Cold & Sinus: 1 tab(s), po, q4 hrs, PRN: as needed for cold symptoms, 0 Refill(s), Type: Maintenance  Pepcid AC: ( 10 mg ), Oral, once, 0 Refill(s), Type: Maintenance  Tylenol: Oral, 0 Refill(s), Type: Maintenance  multivitamin with minerals (w/ Iron): daily, 0 Refill(s), Type: Maintenance      Histories   Social History:        Alcohol Assessment            1 x per week, 1 drinks/episode average.  2 drinks/episode maximum.      Tobacco Assessment            Never (less than 100 in lifetime)      Substance Abuse Assessment            Never      Employment and Education Assessment            Employed, Work/School description: .      Home and Environment Assessment            Marital status: .  Spouse/Partner name: Gil.      Nutrition and Health Assessment            Type of diet: Regular, No bread.      Exercise and Physical Activity Assessment            Exercise frequency: 1-2 times/week.  Exercise type: Walking.                     Comments:                      12/27/2010 - Katharine Hna RN                     2-3 times weekly      Sexual Assessment            Sexually active: Yes.  Sexual orientation: Straight or heterosexual.  Contraceptive Use Details: Intrauterine               device.      Other Assessment            Martial status,         Physical Examination   VS/Measurements      Impression and Plan   Diagnosis     Persistent cough for 3 weeks or longer ([id number]).     Patient Instructions:       Counseled: Patient, Regarding diagnosis, Regarding treatment, Regarding medications, Verbalized understanding.    Orders     Orders (Selected)   Prescriptions  Prescribed  predniSONE 20 mg oral tablet: = 2 tab(s) ( 40 mg ), Oral, daily, x 5 day(s), # 10 tab(s), 0 Refill(s), Type: Acute, Pharmacy: [facility] , 2 tab(s)  Oral daily,x5 day(s).     Continue albuterol. Call if not 100% and may need burst and taper longer course of prednisone and/or further testing.

## 2022-02-16 NOTE — NURSING NOTE
Sources of Information:  [X ] Patient, family member, or caregiver (Please list):  [X ] Hospital discharge summary  [ ] Hospital fax  [ ] List of recent hospitalizations or ED visits  [ ] Other:     Discharged From: [facility]  Discharge Date: 4/26/2020    Diagnosis/Problem: Cough    Medication Changes: [ ] Yes [ X] No   Medication List Updated: [ ] Yes [X ] No    Needs Referral or Lab: [ ] Yes [ X] No    Needs Follow-up Appointment:  [ X] Within 7 days of discharge (highly complex visit)  [ ] Within 14 days of discharge (moderately complex visit)    Appointment Made With: yes, CHT Patient states that she is not feeling any better and would like to have a visit with CHT to discuss options,   Date:    Additional Information Needed and Requested:  [ ] Yes:  [ ] No

## 2022-02-16 NOTE — PROGRESS NOTES
Patient:   LAURYN HANNA            MRN: [id number]            FIN: [id number]               Age:   47 years     Sex:  Female     :  1970   Associated Diagnoses:   Viral tonsillitis   Author:   Moshe Alonso PA-C      Visit Information      Date of Service: 2018 03:12 pm  Performing Location: [facility]  Encounter#: [id number]      Primary Care Provider (PCP):  Gadiel Villarreal MD    NPI# [id number]   Visit type:  New symptom.    Accompanied by:  No one.    Source of history:  Self, Medical record.    History limitation:  None.       Chief Complaint   2018 3:14 PM CDT     sore throat, HA, nausea, fatigue since ; cold sore        History of Present Illness             The patient presents with a sore throat.  The sore throat is described as tight.  The severity of the sore throat is moderate.  The timing/course of the sore throat is constant.  The sore throat has lasted for 3 day(s).  Cold sores, recurrent..  Associated symptoms consist of fatigue, headache and denies fever.  CC above noted and confirmed with the patient..        Review of Systems   Constitutional:  Fatigue.    Eye:  Negative.    Ear/Nose/Mouth/Throat:  Sore throat.    Respiratory:  Negative.    Gastrointestinal:  Negative.    Integumentary:  Negative except as documented in history of present illness.    Neurologic:  Headache.       Health Status   Allergies:    Allergic Reactions (Selected)  No Known Medication Allergies   Problem list:    All Problems  Morbid obesity / SNOMED CT 248250489 / Probable  Resolved: Pregnancy / SNOMED CT 944362138  Resolved: Pregnancy / SNOMED CT 720778881  Resolved: Pregnancy / SNOMED CT 014435575  Resolved: Pregnancy / SNOMED CT 055345793   Medications:  (Selected)   Prescriptions  Prescribed  Valtrex 1 g oral tablet: 2 tab(s) ( 2 gm ), Oral, bid, # 20 tab(s), 1 Refill(s), Type: Maintenance, Pharmacy: [facility] #[id number], 2 tab(s) Oral bid,x1 day(s)  Documented  Medications  Documented  Advil Cold & Sinus: 1 tab(s), po, q4 hrs, PRN: as needed for cold symptoms, 0 Refill(s), Type: Maintenance  Pepcid AC: ( 10 mg ), Oral, once, 0 Refill(s), Type: Maintenance  Zantac: 0 Refill(s), Type: Maintenance  cetirizine: ( 10 mg ), daily, 0 Refill(s), Type: Maintenance  multivitamin with minerals (w/ Iron): daily, 0 Refill(s), Type: Maintenance      Histories   Past Medical History:    Resolved  Pregnancy (399069167):  Resolved on 4/3/1997 at 26 years.  Pregnancy (025735793):  Resolved on 2/7/1998 at 27 years.  Pregnancy (572238877):  Resolved on 9/12/2002 at 31 years.  Pregnancy (950357837):  Resolved on 9/9/1999 at 28 years.   Family History:    Hypothyroid  Mother  Grandmother (M)  Diabetes mellitus  Father  Comments:  12/27/2010 2:08 PM - Guille TAO, Katharine  Insulin Dependent  Brother  Comments:  12/27/2010 2:08 PM - Katharine Han RN  Insulin Dependent  Hyperlipidemia  Mother  Brother     Procedure history:    Insertion of IUD (032567346) on 6/20/2007 at 36 Years.  History of - 1 miscarriage ([id number]).   Social History:        Alcohol Assessment: Denies Alcohol Use            1 x per week, 1 drinks/episode average.  2 drinks/episode maximum.      Tobacco Assessment: Denies Tobacco Use      Substance Abuse Assessment: Denies Substance Abuse      Employment and Education Assessment            Employed, Work/School description: .      Home and Environment Assessment            Marital status: .  Spouse/Partner name: Gil.      Nutrition and Health Assessment            Type of diet: Regular.      Exercise and Physical Activity Assessment: Occasional exercise            Exercise type: Aerobics, Walking.      Sexual Assessment            Sexually active: No.  Contraceptive Use Details: Intrauterine device.      Other Assessment            Martial status,   ,        Alcohol Assessment: Denies Alcohol Use            1 x per week, 1  drinks/episode average.  2 drinks/episode maximum.      Tobacco Assessment: Denies Tobacco Use      Substance Abuse Assessment: Denies Substance Abuse      Employment and Education Assessment            Employed, Work/School description: .      Home and Environment Assessment            Marital status: .  Spouse/Partner name: Gil.      Nutrition and Health Assessment            Type of diet: Regular.      Exercise and Physical Activity Assessment: Occasional exercise            Exercise type: Aerobics, Walking.      Sexual Assessment            Sexually active: No.  Contraceptive Use Details: Intrauterine device.      Other Assessment            Martial status,         Physical Examination   Vital Signs   8/1/2018 3:14 PM CDT Temperature Tympanic 98 DegF    Peripheral Pulse Rate 68 bpm    Pulse Site Radial artery    HR Method Manual    Systolic Blood Pressure 108 mmHg    Diastolic Blood Pressure 60 mmHg    Mean Arterial Pressure 76 mmHg    BP Site Left arm    BP Method Manual      Measurements from flowsheet : Measurements   8/1/2018 3:14 PM CDT Height Measured - Standard 63 in    Weight Measured - Standard 156.8 lb    BSA 1.78 m2    Body Mass Index 27.77 kg/m2  HI      General:  Alert and oriented, No acute distress.    Eye:  Pupils are equal, round and reactive to light, Extraocular movements are intact, Normal conjunctiva.    HENT:  Normocephalic.         Nose: Both nostrils, Discharge ( Small amount, Clear ).         Throat: Pharynx ( Erythematous ).    Neck:  Supple.         Lymph nodes: Tonsillar.    Respiratory:  Lungs are clear to auscultation, Respirations are non-labored, Breath sounds are equal.    Cardiovascular:  Normal rate, Regular rhythm, No murmur.    Integumentary:  Warm, Moist, Herpes labialis lower lip.       Health Maintenance      Recommendations     Pending (in the next year)        OverDue           Lipid Disorders Screen (Female) due  12/27/14  and  every 1  year(s)           Breast Cancer Screen due  12/30/14  and every 1  year(s)           Cervical Cancer Screen (if sexually active) due  12/27/16  and every 3  year(s)           Alcohol Misuse Screen (Female) due  10/26/17  and every 1  year(s)           Depression Screen (Female) due  10/26/17  and every 1  year(s)     Satisfied (in the past 1 year)        Satisfied            Body Mass Index Check (Female) on  08/01/18.           Body Mass Index Check (Female) on  03/20/18.           High Blood Pressure Screen (Female) on  08/01/18.           High Blood Pressure Screen (Female) on  03/20/18.           High Blood Pressure Screen (Female) on  01/24/18.           Tobacco Use Screen (Female) on  08/01/18.           Tobacco Use Screen (Female) on  03/20/18.           Tobacco Use Screen (Female) on  01/24/18.        Review / Management   Results review:  Strep test was negative..       Impression and Plan   Diagnosis     Viral tonsillitis (SAA26-PF J03.80).     Supportive therapy.  Recheck as we have discussed.  Culture pending. Valtrex for cold sores PRN.

## 2022-02-16 NOTE — TELEPHONE ENCOUNTER
---------------------  From: Willie Priest MD   To: ARIJEANELAURYN MERCED    Sent: 10/30/2019 8:55:32 AM CDT  Subject: Patient Message - Results Notification     Your tests look good.       Results:  Date Result Name Ind Value Ref Range   10/29/2019 11:21 AM Sodium Level  140 mmol/L (135 - 146)   10/29/2019 11:21 AM Potassium Level  4.5 mmol/L (3.5 - 5.3)   10/29/2019 11:21 AM Chloride Level  106 mmol/L (98 - 110)   10/29/2019 11:21 AM CO2 Level  29 mmol/L (20 - 32)   10/29/2019 11:21 AM Glucose Level  97 mg/dL (65 - 99)   10/29/2019 11:21 AM BUN  20 mg/dL (7 - 25)   10/29/2019 11:21 AM Creatinine Level  0.83 mg/dL (0.50 - 1.10)   10/29/2019 11:21 AM eGFR  84 mL/min/1.73m2 (> OR = 60 - )   10/29/2019 11:21 AM Calcium Level  9.7 mg/dL (8.6 - 10.2)   10/29/2019 11:21 AM Cholesterol ((H)) 207 mg/dL ( - <200)   10/29/2019 11:21 AM Non-HDL Cholesterol ((H)) 146 ( - <130)   10/29/2019 11:21 AM HDL  61 mg/dL (>50 - )   10/29/2019 11:21 AM Cholesterol/HDL Ratio  3.4 ( - <5.0)   10/29/2019 11:21 AM LDL ((H)) 122    10/29/2019 11:21 AM Triglyceride  127 mg/dL ( - <150)   10/29/2019 11:21 AM TSH  2.05 mIU/L    10/29/2019 11:21 AM WBC  4.5 (3.8 - 10.8)   10/29/2019 11:21 AM RBC  4.46 (3.80 - 5.10)   10/29/2019 11:21 AM Hgb  13.5 gm/dL (11.7 - 15.5)   10/29/2019 11:21 AM Hct  39.6 % (35.0 - 45.0)   10/29/2019 11:21 AM MCV  88.8 fL (80.0 - 100.0)   10/29/2019 11:21 AM MCH  30.3 pg (27.0 - 33.0)   10/29/2019 11:21 AM MCHC  34.1 gm/dL (32.0 - 36.0)   10/29/2019 11:21 AM RDW  11.8 % (11.0 - 15.0)   10/29/2019 11:21 AM Platelet  184 (140 - 400)   10/29/2019 11:21 AM MPV  11.9 fL (7.5 - 12.5)

## 2022-02-16 NOTE — NURSING NOTE
CAGE Assessment Entered On:  10/29/2019 12:01 PM CDT    Performed On:  10/29/2019 12:01 PM CDT by Tiara Reynolds MA               Assessment   Have you ever felt you should cut down on your drinking :   No   Have people annoyed you by criticizing your drinking :   No   Have you ever felt bad or guilty about your drinking :   No   Have you ever taken a drink first thing in the morning to steady your nerves or get rid of a hangover (Eye-opener) :   No   CAGE Score :   0    Tiara Reynolds MA - 10/29/2019 12:01 PM CDT

## 2022-02-16 NOTE — TELEPHONE ENCOUNTER
---------------------  From: Mónica Morocho CMA (Phone Messages Pool ([id number])   To: Gadiel Villarreal MD;     Sent: 3/19/2019 2:04:45 PM CDT  Subject: phone note- Tamilfu     Phone Message    PCP:    CHT      Time of Call:  1:51pm       Person Calling:  Brionna  Phone number:  [phone] ok to leave detailed message    Returned call at: 1:59pm    Note:  Brionna called stating her  was diagnosed with Influenza in [place]. Patient has paperwork. Brionna is going on vacation next week with her daughter and does not want to be sick with the flu. Asking for rx's of Tamiflu for both of them. Called and notified Brionna that Tamiflu is not used as a preventative medication, it is meant to help shorten the length and severity of the flu. She understands but would like for me to ask you for prescriptions. Daughter's  is 2002.    [facility]    Last office visit and reason:  18 sore throat    Transferred to: _  ** Submitted: **  Order:oseltamivir (Tamiflu 75 mg oral capsule)  1 cap(s)  Oral  daily  Qty:  10 cap(s)        Duration:  10 day(s)        Refills:  0          Substitutions Allowed     Route To Pharmacy - [id number]    Signed by Gadiel Villarreal MD  3/19/2019 3:55:00 PM---------------------  From: Gadiel Villarreal MD   To: Phone Messages Pool ([id number] [facility] [place]);     Sent: 3/19/2019 3:56:34 PM CDT  Subject: RE: phone note- Tamilfu     It is used as a preventative once a day for 10 days.  ** Submitted: **  Order:oseltamivir (Tamiflu 75 mg oral capsule)  1 cap(s)  Oral  daily  Qty:  10 cap(s)        Duration:  10 day(s)        Refills:  0          Substitutions Allowed     [id number]    Signed by Gadiel Villarreal MD  3/19/2019 3:58:00 PMReturned Call  Time: 4:01 pm  Note:  Called & spoke with Brionna. Understood directions for both her and her daughter. No further questions.

## 2022-02-16 NOTE — NURSING NOTE
Comprehensive Intake Entered On:  5/20/2020 10:30 AM CDT    Performed On:  5/20/2020 10:25 AM CDT by Vickie Najera CMA               Summary   Chief Complaint :   Video Visit: follow-up Cough, feels about 60% better, still has cough, no energy, had a fever at the start, had xrays at [facility], NO COVID testing done, Son did have negative COVID test last week, Verbal Consent for video visit   Height Measured :   63 in(Converted to: 5 ft 3 in, 160.02 cm)    Vickie Najera CMA - 5/20/2020 10:25 AM CDT   Health Status   Allergies Verified? :   Yes   Medication History Verified? :   Yes   Immunizations Current :   Yes   Medical History Verified? :   Yes   Pre-Visit Planning Status :   Completed   Tobacco Use? :   Never smoker   Vickie Najera CMA - 5/20/2020 10:25 AM CDT   Consents   Consent for Immunization Exchange :   Consent Granted   Consent for Immunizations to Providers :   Consent Granted   Vickie Najera CMA - 5/20/2020 10:25 AM CDT   Meds / Allergies   (As Of: 5/20/2020 10:30:04 AM CDT)   Allergies (Active)   No Known Medication Allergies  Estimated Onset Date:   Unspecified ; Created By:   Ann Pretty CMA; Reaction Status:   Active ; Category:   Drug ; Substance:   No Known Medication Allergies ; Type:   Allergy ; Updated By:   Ann Pretty CMA; Reviewed Date:   5/20/2020 10:29 AM CDT        Medication List   (As Of: 5/20/2020 10:30:04 AM CDT)   Prescription/Discharge Order    albuterol  :   albuterol ; Status:   Prescribed ; Ordered As Mnemonic:   albuterol 90 mcg/inh inhalation aerosol ; Simple Display Line:   2 puff(s), INH, QID, PRN: for wheezing, 17 g, 0 Refill(s) ; Ordering Provider:   Gadiel Villarreal MD; Catalog Code:   albuterol ; Order Dt/Tm:   4/28/2020 8:45:54 AM CDT          azithromycin  :   azithromycin ; Status:   Processing ; Ordered As Mnemonic:   azithromycin 250 mg oral tablet ; Ordering Provider:   Gadiel Villarreal MD; Action Display:   Complete ; Catalog Code:   azithromycin ;  Order Dt/Tm:   5/20/2020 10:26:00 AM CDT          valACYclovir  :   valACYclovir ; Status:   Prescribed ; Ordered As Mnemonic:   Valtrex 1 g oral tablet ; Simple Display Line:   2 gm, 2 tab(s), Oral, bid, for 1 day(s), 20 tab(s), 1 Refill(s) ; Ordering Provider:   Willie Priest MD; Catalog Code:   valACYclovir ; Order Dt/Tm:   10/29/2019 11:08:30 AM CDT            Home Meds    acetaminophen  :   acetaminophen ; Status:   Documented ; Ordered As Mnemonic:   Tylenol ; Simple Display Line:   Oral, 0 Refill(s) ; Catalog Code:   acetaminophen ; Order Dt/Tm:   4/28/2020 8:23:23 AM CDT          famotidine  :   famotidine ; Status:   Documented ; Ordered As Mnemonic:   Pepcid AC ; Simple Display Line:   10 mg, Oral, once, 0 Refill(s) ; Catalog Code:   famotidine ; Order Dt/Tm:   8/1/2018 3:16:57 PM CDT          ibuprofen-pseudoephedrine  :   ibuprofen-pseudoephedrine ; Status:   Documented ; Ordered As Mnemonic:   Advil Cold & Sinus ; Simple Display Line:   1 tab(s), po, q4 hrs, PRN: as needed for cold symptoms, 0 Refill(s) ; Catalog Code:   ibuprofen-pseudoephedrine ; Order Dt/Tm:   11/18/2015 2:09:38 PM CST          multivitamin with minerals  :   multivitamin with minerals ; Status:   Documented ; Ordered As Mnemonic:   multivitamin with minerals (w/ Iron) ; Simple Display Line:   daily, 0 Refill(s) ; Catalog Code:   multivitamin with minerals ; Order Dt/Tm:   11/18/2015 2:09:26 PM CST          raNITIdine  :   raNITIdine ; Status:   Processing ; Ordered As Mnemonic:   Zantac ; Action Display:   Complete ; Catalog Code:   raNITIdine ; Order Dt/Tm:   5/20/2020 10:29:46 AM CDT            ID Risk Screen   Recent Travel History :   No recent travel   Family Member Travel History :   No recent travel   Other Exposure to Infectious Disease :   Unknown   Vickie Najera CMA 5/20/2020 10:25 AM CDT

## 2022-02-16 NOTE — PROGRESS NOTES
Patient:   LAURYN HANNA            MRN: [id number]            FIN: [id number]               Age:   46 years     Sex:  Female     :  1970   Associated Diagnoses:   Pneumonia   Author:   Moshe Alonso PA-C      Visit Information      Date of Service: 2017 01:49 pm  Performing Location: [facility]  Encounter#: [id number]      Primary Care Provider (PCP):  Gadiel Villarreal MD    NPI# [id number]   Visit type:  New symptom.    Source of history:  Self, Medical record.    History limitation:  None.       Chief Complaint   3/21/2017 1:54 PM CDT    cough, congestion, chest is heavy, joints ache, fatigue, sweats/chills x 1 week; cough getting worse      History of Present Illness             The patient presents with cough.  The cough is described as productive clear sputum.  The severity of the cough is moderate.  The cough fluctuates in intensity and is worsening.  The cough has lasted for 2 week(s).  The context of the cough: occurred in association with illness.  Associated symptoms consist of chills, fever, nasal congestion and shortness of breath.        Review of Systems   Eye:  Negative.    Ear/Nose/Mouth/Throat:  Negative except as documented in history of present illness.    Respiratory:  Negative except as documented in history of present illness.             Health Status   Allergies:    Allergic Reactions (All)  No known allergies   Medications:  (Selected)   Prescriptions  Prescribed  Zofran ODT 4 mg oral tablet, disintegratin tab(s) ( 4 mg ), po, q6 hrs, PRN: as needed for nausea/vomiting, # 10 tab(s), 1 Refill(s), Type: Maintenance, Pharmacy: [id number], 1 tab(s) po q6 hrs,PRN:as needed for nausea/vomiting  Documented Medications  Documented  Advil Cold & Sinus: 1 tab(s), po, q4 hrs, PRN: as needed for cold symptoms, 0 Refill(s), Type: Maintenance  multivitamin with minerals (w/ Iron): daily, 0 Refill(s), Type: Maintenance   Problem list:    All  Problems  Morbid obesity / SNOMED CT 220091619 / Probable  Resolved: Pregnancy / SNOMED CT 598184475  Resolved: Pregnancy / SNOMED CT 799285323  Resolved: Pregnancy / SNOMED CT 113685543  Resolved: Pregnancy / SNOMED CT 060277674      Histories   Past Medical History:    Resolved  Pregnancy (890336827):  Resolved on 4/3/1997 at 26 years.  Pregnancy (202346227):  Resolved on 2/7/1998 at 27 years.  Pregnancy (806337731):  Resolved on 9/12/2002 at 31 years.  Pregnancy (647560028):  Resolved on 9/9/1999 at 28 years.   Family History:    Hypothyroid  Mother  Grandmother (M)  Diabetes mellitus  Father  Comments:  12/27/2010 2:08 PM - Guille TAO, Katharine  Insulin Dependent  Brother  Comments:  12/27/2010 2:08 PM - Katharine Han RN  Insulin Dependent  Hyperlipidemia  Mother  Brother     Procedure history:    Insertion of IUD (526153054) on 6/20/2007 at 36 Years.  History of - 1 miscarriage (8092413865).   Social History:        Alcohol Assessment: Denies Alcohol Use            1 x per week, 1 drinks/episode average.  2 drinks/episode maximum.      Tobacco Assessment: Denies Tobacco Use      Substance Abuse Assessment: Denies Substance Abuse      Employment and Education Assessment            Employed, Work/School description: .      Home and Environment Assessment            Marital status: .  Spouse/Partner name: Gil.      Nutrition and Health Assessment            Type of diet: Regular.      Exercise and Physical Activity Assessment: Occasional exercise            Exercise type: Aerobics, Walking.      Sexual Assessment            Sexually active: No.  Contraceptive Use Details: Intrauterine device.      Other Assessment            Martial status,         Physical Examination   Vital Signs   3/21/2017 1:54 PM CDT Temperature Tympanic 101.6 DegF  HI    Peripheral Pulse Rate 110 bpm  HI    HR Method Electronic    Systolic Blood Pressure 100 mmHg    Diastolic Blood Pressure 58 mmHg   LOW    Mean Arterial Pressure 72 mmHg    BP Site Right arm    BP Method Manual    Oxygen Saturation 96 %      Measurements from flowsheet : Measurements   3/21/2017 1:54 PM CDT    Weight Measured - Standard                160 lb     General:  Alert and oriented, No acute distress.    Eye:  Pupils are equal, round and reactive to light, Extraocular movements are intact, Normal conjunctiva.    HENT:  Normocephalic, Oral mucosa is moist, No pharyngeal erythema.    Neck:  Supple, Non-tender, No lymphadenopathy.    Respiratory:  Respirations are non-labored, Breath sounds are equal.         Breath sounds: Rhonchi present.    Cardiovascular:  Normal rate, Regular rhythm, No murmur.    Psychiatric:  Cooperative, Appropriate mood & affect.       Review / Management   Results review:  CBC is normal..       Impression and Plan   Diagnosis     Pneumonia (ATL51-XX J18.9).     Patient Instructions:       Counseled: Patient, Regarding diagnosis, Regarding medications, Regarding activity, Verbalized understanding.    Orders     Orders (Selected)   Prescriptions  Prescribed  Augmentin 875 mg oral tablet: 1 tab(s), PO, q12hr, x 10 day(s), # 20 tab(s), 0 Refill(s), Type: Acute, Pharmacy: [id number], 1 tab(s) po q12 hrs,x10 day(s).     Take medicine as prescribed, side effects discussed.  Tylenol/ibuprofen for fever and discomfort.  Push fluids.  RTC if not improving in 36-48 hours, prior if concerns as we have discussed.

## 2022-02-16 NOTE — NURSING NOTE
Depression Screening Entered On:  10/29/2019 1:00 PM CDT    Performed On:  10/29/2019 12:01 PM CDT by Tiara Reynolds MA               Depression Screening   Little Interest - Pleasure in Activities :   More than half the days   Feeling Down, Depressed, Hopeless :   Several days   Initial Depression Screen Score :   3    Trouble Falling or Staying Asleep :   Several days   Feeling Tired or Little Energy :   Several days   Poor Appetite or Overeating :   Several days   Feeling Bad About Yourself :   Not at all   Trouble Concentrating :   More than half the days   Moving or Speaking Slowly :   Not at all   Thoughts Better Off Dead or Hurting Self :   Not at all   Detailed Depression Screen Score :   5    Total Depression Screen Score :   8    Tiara Reynolds MA - 10/29/2019 12:01 PM CDT

## 2022-02-16 NOTE — PROCEDURES
Accession Number:       [id number]  CLINICAL INFORMATION::     Normal exam  LMP::     NONE GIVEN  PREV. PAP::     12/27/2013  PREV. BX::     NONE GIVEN  SOURCE::     Cervix, Endocervix  STATEMENT OF ADEQUACY::     Satisfactory for evaluation. Endocervical/transformation zone component present. Age and/or menstrual status not provided  INTERPRETATION/RESULT::     Negative for intraepithelial lesion or malignancy.  COMMENT::     This Pap test has been evaluated with computer assisted technology.  CYTOTECHNOLOGIST::     NAE CT(ASCP) CT Screening location: [place] [address]  COMMENT:     See comment       EXPLANATORY NOTE:         The Pap is a screening test for cervical cancer. It is       not a diagnostic test and is subject to false negative       and false positive results. It is most reliable when a       satisfactory sample, regularly obtained, is submitted       with relevant clinical findings and history, and when       the Pap result is evaluated along with historic and       current clinical information.  HPV mRNA E6/E7:     Not Detected       This test was performed using the APTIMA HPV Assay ([employer]       This assay detects E6/E7 viral messenger RNA (mRNA) from 14       high-risk HPV types (16,18,31,33,35,39,45,51,52,56,58,59,66,68).         The analytical performance characteristics of       this assay have been determined by [employer]. The modifications have not been       cleared or approved by the FDA. This assay has       been validated pursuant to the CLIA regulations       and is used for clinical purposes.

## 2022-02-16 NOTE — PROGRESS NOTES
Patient:   LAURYN HANNA            MRN: [id number]            FIN: [id number]               Age:   47 years     Sex:  Female     :  1970   Associated Diagnoses:   Cough; Right shoulder tendinitis   Author:   Moshe Alonso PA-C      Visit Information      Date of Service: 2018 06:00 pm  Performing Location: [facility]  Encounter#: [id number]      Primary Care Provider (PCP):  Gadiel Villarreal MD    NPI# [id number]      Referring Provider:  Moshe Alonso PA-C    NPI# [id number]   Visit type:  General concerns.    Accompanied by:  No one.    Source of history:  Self, Medical record.    Referral source:  Self.    History limitation:  None.       Chief Complaint   3/20/2018 6:03 PM CDT    c/o cough worsening; c/o sharp pains in side of R arm, hurts to move        History of Present Illness             The patient presents with cough.  The cough is described as hacking.  The severity of the cough is moderate.  The cough is constant.  The cough has lasted for 2 month(s).  Had influenza A. Cough persists. Bilateral R > L shoulder pain. ROM is painful. Lots of mouse and keyboarding. no fever or chills. Cough is dry. No increased reflux. No spring allergies. CC above noted and confirmed with the patient..        Review of Systems   Constitutional:  Negative except as documented in history of present illness.    Eye:  Negative.    Ear/Nose/Mouth/Throat:  Negative.    Respiratory:  Negative except as documented in history of present illness.    Musculoskeletal:  Negative except as documented in history of present illness.    Integumentary:  Negative.    Neurologic:  Negative.             Health Status   Allergies:    Allergic Reactions (All)  No Known Medication Allergies  Canceled/Inactive Reactions (All)  No known allergies   Medications:  (Selected)   Prescriptions  Prescribed  Zithromax 250 mg oral tablet: 1 packet(s), PO, Once, Instructions: as directed on package labeling. Two tablets po on  day one, then one tablet daily x four days, # 6 tab(s), 0 Refill(s), Type: Soft Stop, Pharmacy: [facility] #[id number], 1 packet(s) po once,Instr:as directed on pac...  predniSONE 20 mg oral tablet: 1 tab(s) ( 20 mg ), PO, Daily, # 7 tab(s), 0 Refill(s), Type: Maintenance, Pharmacy: [facility] #[id number], 1 tab(s) po daily,x7 day(s)  Documented Medications  Documented  Advil Cold & Sinus: 1 tab(s), po, q4 hrs, PRN: as needed for cold symptoms, 0 Refill(s), Type: Maintenance  multivitamin with minerals (w/ Iron): daily, 0 Refill(s), Type: Maintenance   Problem list:    All Problems (Selected)  Morbid obesity / SNOMED CT 935644789 / Probable      Histories   Past Medical History:    Resolved  Pregnancy (744855924):  Resolved on 4/3/1997 at 26 years.  Pregnancy (775532600):  Resolved on 2/7/1998 at 27 years.  Pregnancy (764086465):  Resolved on 9/12/2002 at 31 years.  Pregnancy (943032879):  Resolved on 9/9/1999 at 28 years.   Family History:    Hypothyroid  Mother  Grandmother (M)  Diabetes mellitus  Father  Comments:  12/27/2010 2:08 PM - Katharine Han RN  Insulin Dependent  Brother  Comments:  12/27/2010 2:08 PM - Katharine Han RN  Insulin Dependent  Hyperlipidemia  Mother  Brother     Procedure history:    Insertion of IUD (526602192) on 6/20/2007 at 36 Years.  History of - 1 miscarriage (6016834393).   Social History:        Alcohol Assessment: Denies Alcohol Use            1 x per week, 1 drinks/episode average.  2 drinks/episode maximum.      Tobacco Assessment: Denies Tobacco Use      Substance Abuse Assessment: Denies Substance Abuse      Employment and Education Assessment            Employed, Work/School description: .      Home and Environment Assessment            Marital status: .  Spouse/Partner name: Gil.      Nutrition and Health Assessment            Type of diet: Regular.      Exercise and Physical Activity Assessment: Occasional exercise            Exercise  type: Aerobics, Walking.      Sexual Assessment            Sexually active: No.  Contraceptive Use Details: Intrauterine device.      Other Assessment            Martial status,         Physical Examination   Vital Signs   3/20/2018 6:03 PM CDT Temperature Tympanic 98.5 DegF    Peripheral Pulse Rate 98 bpm    Pulse Site Radial artery    HR Method Electronic    Systolic Blood Pressure 104 mmHg    Diastolic Blood Pressure 62 mmHg    Mean Arterial Pressure 76 mmHg    BP Site Right arm    BP Method Manual    Oxygen Saturation 97 %      Measurements from flowsheet : Measurements   3/20/2018 6:03 PM CDT Height Measured - Standard 63 in    Weight Measured - Standard 164.8 lb    BSA 1.82 m2    Body Mass Index 29.19 kg/m2  HI      HENT:  Normocephalic, Tympanic membranes are clear, Oral mucosa is moist.    Neck:  Supple, Non-tender, No lymphadenopathy.    Respiratory:  Lungs are clear to auscultation, Respirations are non-labored, Breath sounds are equal.    Cardiovascular:  Normal rate, Regular rhythm, No murmur.         Arterial pulses: Bilateral, Radial, 2+.    Musculoskeletal:  Normal strength, No swelling, No deformity, Painful in right deltoid. Limited internal rotation of right shoulder..    Integumentary:  No rash.    Neurologic:  No focal deficits.       Review / Management   Chest x-ray results   Posterior/anterior, Lateral, No acute finding      Impression and Plan   Diagnosis     Cough ([id number] R05).     Right shoulder tendinitis (VTK03-YW M75.81).     Patient Instructions:       Counseled: Patient, Regarding diagnosis, Regarding treatment, Regarding medications, Regarding activity, Verbalized understanding.    Orders     Orders (Selected)   Prescriptions  Prescribed  Zithromax 250 mg oral tablet: 1 packet(s), PO, Once, Instructions: as directed on package labeling. Two tablets po on day one, then one tablet daily x four days, # 6 tab(s), 0 Refill(s), Type: Soft Stop, Pharmacy: [id number], 1  packet(s) po once,Instr:as directed on pac...  predniSONE 20 mg oral tablet: 1 tab(s) ( 20 mg ), PO, Daily, # 7 tab(s), 0 Refill(s), Type: Maintenance, Pharmacy: [facility] #[id number], 1 tab(s) po daily,x7 day(s).     Shoulder exercises. FU in two weeks if cough persists.  FU if shoulder not gradually improved over next two weeks.

## 2022-02-16 NOTE — LETTER
(Inserted Image. Unable to display)     October 30, 2020      LAURYN HANNA  [address], [place] [id number]          Dear LAURYN,      Thank you for selecting [facility] for your healthcare needs.    Our records indicate you are due for the following services:     Annual Physical.    (FYI   Regarding office visits: In some instances, a video visit or telephone visit may be offered as an option.)      To schedule an appointment or if you have further questions, please contact your primary clinic:   [facility]       [phone]   [facility]       [phone]              [facility]     [phone]      Powered by [employer] and [employer]    Sincerely,    Willie Priest MD

## 2022-02-16 NOTE — NURSING NOTE
Comprehensive Intake Entered On:  10/29/2019 10:22 AM CDT    Performed On:  10/29/2019 10:11 AM CDT by Mary Turcios CMA               Summary   Chief Complaint :   Patient presents for annual physical. Pressure bilateral sternum moves inner left and right upper arms, aslo, inner left thigh sharp pain off/on x 6 month. Fith digit on left foot gray spot and right great toe with gray spots no known injury x 6 weeks.   Weight Measured :   150.2 lb(Converted to: 150 lb 3 oz, 68.13 kg)    Height Measured :   63 in(Converted to: 5 ft 3 in, 160.02 cm)    Body Mass Index :   26.6 kg/m2 (HI)    Body Surface Area :   1.74 m2   Systolic Blood Pressure :   126 mmHg   Diastolic Blood Pressure :   70 mmHg   Mean Arterial Pressure :   89 mmHg   Peripheral Pulse Rate :   91 bpm   BP Site :   Right arm   BP Method :   Manual   HR Method :   Electronic   Temperature Tympanic :   98.1 DegF(Converted to: 36.7 DegC)    Oxygen Saturation :   97 %   Mary Turcios CMA - 10/29/2019 10:11 AM CDT   Health Status   Allergies Verified? :   Yes   Medication History Verified? :   Yes   Immunizations Current :   Yes   Pre-Visit Planning Status :   Completed   Tobacco Use? :   Never smoker   Mary Turcios CMA - 10/29/2019 10:11 AM CDT   Consents   Consent for Immunization Exchange :   Consent Granted   Consent for Immunizations to Providers :   Consent Granted   Mary Turcios CMA - 10/29/2019 10:11 AM CDT   Meds / Allergies   (As Of: 10/29/2019 10:22:38 AM CDT)   Allergies (Active)   No Known Medication Allergies  Estimated Onset Date:   Unspecified ; Created By:   Ann Pretty CMA; Reaction Status:   Active ; Category:   Drug ; Substance:   No Known Medication Allergies ; Type:   Allergy ; Updated By:   Ann Pretty CMA; Reviewed Date:   10/29/2019 10:20 AM CDT        Medication List   (As Of: 10/29/2019 10:22:38 AM CDT)   Prescription/Discharge Order    valACYclovir  :   valACYclovir ; Status:   Prescribed ; Ordered As Mnemonic:    Valtrex 1 g oral tablet ; Simple Display Line:   2 gm, 2 tab(s), Oral, bid, for 1 day(s), 20 tab(s), 1 Refill(s) ; Ordering Provider:   Moshe Alonso PA-C; Catalog Code:   valACYclovir ; Order Dt/Tm:   8/1/2018 3:31:09 PM CDT            Home Meds    famotidine  :   famotidine ; Status:   Documented ; Ordered As Mnemonic:   Pepcid AC ; Simple Display Line:   10 mg, Oral, once, 0 Refill(s) ; Catalog Code:   famotidine ; Order Dt/Tm:   8/1/2018 3:16:57 PM CDT          ibuprofen-pseudoephedrine  :   ibuprofen-pseudoephedrine ; Status:   Documented ; Ordered As Mnemonic:   Advil Cold & Sinus ; Simple Display Line:   1 tab(s), po, q4 hrs, PRN: as needed for cold symptoms, 0 Refill(s) ; Catalog Code:   ibuprofen-pseudoephedrine ; Order Dt/Tm:   11/18/2015 2:09:38 PM CST          multivitamin with minerals  :   multivitamin with minerals ; Status:   Documented ; Ordered As Mnemonic:   multivitamin with minerals (w/ Iron) ; Simple Display Line:   daily, 0 Refill(s) ; Catalog Code:   multivitamin with minerals ; Order Dt/Tm:   11/18/2015 2:09:26 PM CST          raNITIdine  :   raNITIdine ; Status:   Documented ; Ordered As Mnemonic:   Zantac ; Simple Display Line:   0 Refill(s) ; Catalog Code:   raNITIdine ; Order Dt/Tm:   8/1/2018 3:17:03 PM CDT